# Patient Record
Sex: FEMALE | URBAN - METROPOLITAN AREA
[De-identification: names, ages, dates, MRNs, and addresses within clinical notes are randomized per-mention and may not be internally consistent; named-entity substitution may affect disease eponyms.]

---

## 2024-05-16 ENCOUNTER — APPOINTMENT (RX ONLY)
Dept: URBAN - METROPOLITAN AREA CLINIC 120 | Facility: CLINIC | Age: 24
Setting detail: DERMATOLOGY
End: 2024-05-16

## 2024-05-16 DIAGNOSIS — L30.1 DYSHIDROSIS [POMPHOLYX]: ICD-10-CM

## 2024-05-16 PROCEDURE — 99203 OFFICE O/P NEW LOW 30 MIN: CPT

## 2024-05-16 PROCEDURE — ? PRESCRIPTION

## 2024-05-16 RX ORDER — CLOBETASOL PROPIONATE 0.5 MG/G
CREAM TOPICAL
Qty: 60 | Refills: 2 | Status: ERX | COMMUNITY
Start: 2024-05-16

## 2024-05-16 RX ADMIN — CLOBETASOL PROPIONATE: 0.5 CREAM TOPICAL at 00:00

## 2024-05-16 NOTE — HPI: RASH
Is This A New Presentation, Or A Follow-Up?: Rash
On zoloft 50 mg daily, still having some difficulty with anxiety. Recently had a panic attack at work   Was prescribed ativan, took it only once  Denies any depression, insomnia, appetite changes  
Additional History: Reaction after wearing ring